# Patient Record
Sex: MALE | Race: WHITE | Employment: FULL TIME | URBAN - METROPOLITAN AREA
[De-identification: names, ages, dates, MRNs, and addresses within clinical notes are randomized per-mention and may not be internally consistent; named-entity substitution may affect disease eponyms.]

---

## 2019-05-22 ENCOUNTER — OFFICE VISIT (OUTPATIENT)
Dept: FAMILY MEDICINE | Facility: CLINIC | Age: 39
End: 2019-05-22

## 2019-05-22 VITALS
OXYGEN SATURATION: 97 % | SYSTOLIC BLOOD PRESSURE: 114 MMHG | HEART RATE: 51 BPM | DIASTOLIC BLOOD PRESSURE: 70 MMHG | HEIGHT: 66 IN | BODY MASS INDEX: 29.35 KG/M2 | TEMPERATURE: 97.6 F | WEIGHT: 182.6 LBS

## 2019-05-22 DIAGNOSIS — Z71.89 ACP (ADVANCE CARE PLANNING): ICD-10-CM

## 2019-05-22 DIAGNOSIS — N50.89 TESTICULAR NODULE: Primary | ICD-10-CM

## 2019-05-22 DIAGNOSIS — Z76.89 HEALTH CARE HOME: ICD-10-CM

## 2019-05-22 PROCEDURE — 99202 OFFICE O/P NEW SF 15 MIN: CPT | Performed by: FAMILY MEDICINE

## 2019-05-22 SDOH — HEALTH STABILITY: MENTAL HEALTH: HOW OFTEN DO YOU HAVE A DRINK CONTAINING ALCOHOL?: 2-3 TIMES A WEEK

## 2019-05-22 SDOH — HEALTH STABILITY: MENTAL HEALTH: HOW MANY STANDARD DRINKS CONTAINING ALCOHOL DO YOU HAVE ON A TYPICAL DAY?: 3 OR 4

## 2019-05-22 ASSESSMENT — MIFFLIN-ST. JEOR: SCORE: 1683.08

## 2019-05-22 NOTE — PROGRESS NOTES
SUBJECTIVE: Álvaro Benjamin is a 38 year old male who presents for left testicular nodule for 6-7 months-was tender initially but not now.  He thinks it has gotten slightly bigger but states it seems to fluctuate.  No dysuria. No weight loss of fatigue.    Patient Active Problem List   Diagnosis     Health Care Home     ACP (advance care planning)     History reviewed. No pertinent past medical history.  No family history on file.  Social History     Socioeconomic History     Marital status:      Spouse name: Not on file     Number of children: Not on file     Years of education: Not on file     Highest education level: Not on file   Occupational History     Occupation: plaster and taping   Social Needs     Financial resource strain: Not on file     Food insecurity:     Worry: Not on file     Inability: Not on file     Transportation needs:     Medical: Not on file     Non-medical: Not on file   Tobacco Use     Smoking status: Never Smoker     Smokeless tobacco: Current User   Substance and Sexual Activity     Alcohol use: Yes     Frequency: 2-3 times a week     Drinks per session: 3 or 4     Drug use: Not on file     Sexual activity: Not on file   Lifestyle     Physical activity:     Days per week: Not on file     Minutes per session: Not on file     Stress: Not on file   Relationships     Social connections:     Talks on phone: Not on file     Gets together: Not on file     Attends Taoism service: Not on file     Active member of club or organization: Not on file     Attends meetings of clubs or organizations: Not on file     Relationship status: Not on file     Intimate partner violence:     Fear of current or ex partner: Not on file     Emotionally abused: Not on file     Physically abused: Not on file     Forced sexual activity: Not on file   Other Topics Concern     Not on file   Social History Narrative     Not on file     Past Surgical History:   Procedure Laterality Date     APPENDECTOMY OPEN   "2013       No current outpatient medications on file prior to visit.  No current facility-administered medications on file prior to visit.      Allergies: Patient has no known allergies.      There is no immunization history on file for this patient.     OBJECTIVE:   /70 (BP Location: Right arm, Patient Position: Sitting, Cuff Size: Adult Large)   Pulse 51   Temp 97.6  F (36.4  C) (Oral)   Ht 1.664 m (5' 5.5\")   Wt 82.8 kg (182 lb 9.6 oz)   SpO2 97%   BMI 29.92 kg/m     Small, firm <1 cm nodule on left side-very difficult to ascertain if this is affixed to testicle or just above in scrotal skin, nontender,  Shaft normal, uncircumcised, meatus normal without discharge. No inguinal hernia noted. No inguinal lymphadenopathy.    ASSESSMENT: /PLAN:   (N50.89) Testicular nodule  (primary encounter diagnosis)  Comment: likely cyst, possibly epididymal but given difficulty determining location and possible increase in size needs US  Plan: US Testicular and Scrotum, RADIOLOGY REFERRAL            (Z76.89) Health Care Home  Comment:   Plan:     (Z71.89) ACP (advance care planning)  Comment:   Plan:    v     "

## 2019-05-22 NOTE — NURSING NOTE
Álvaro is here for lump in testicle    Pre-visit Screening:  Immunizations:  up to date  Colonoscopy:  NA  Mammogram: NA  Asthma Action Test/Plan:  NA  PHQ9:  None  GAD7:  None  Questioned patient about current smoking habits Pt. has never smoked.  Ok to leave detailed message on voice mail for today's visit only Yes, phone # [phone]

## 2019-05-22 NOTE — LETTER
[facility]  [address]  [address]  [facility] [address]  [phone]      May 22, 2019      Álvaro Benjamin  [address]  [place] [address]      EMERGENCY CARE PLAN  Presenting Problem Treatment Plan   Questions or concerns during clinic hours I will call the clinic directly:    [facility]  [address] , [address]  [place] [address]  [phone]   Questions or concerns outside clinic hours  I will call the 24 hour line at [phone]   Patient needs to schedule an appointment  I will call the  scheduling line at [phone]   Same day treatment   I will call the clinic first, then  urgent care and/or  express care if needed   Clinic Care Coordinators Mary Ellen Dickerson RN:  [phone]  [facility] Clinical Support Staff: [phone]    Crisis Services:  Behavioral or Mental Health P ([facility])   [phone]   Emergency treatment--Immediately CALL [phone]

## 2019-05-23 NOTE — NURSING NOTE
Telephone call made to schedule Álvaro Benjamin for the following: Ultrasound: scrotum    Date: 05/23/2019  Time: 5:30  Place: [facility]

## 2019-05-28 ENCOUNTER — TELEPHONE (OUTPATIENT)
Dept: FAMILY MEDICINE | Facility: CLINIC | Age: 39
End: 2019-05-28

## 2019-05-28 DIAGNOSIS — N50.89 TESTICULAR NODULE: ICD-10-CM

## 2019-05-28 NOTE — TELEPHONE ENCOUNTER
Álvaro's wife, Alli, called back wanting a little bit more information about Álvaro's ultrasound findings. She wanted to confirm that it was a lump filled with fluid? Like a cyst? How will it go away, or recommendations to help it go away? Also, what if it begins to become painful what to do? Please advise, thanks.        Call back: [phone]

## 2021-01-07 PROCEDURE — 25600 CLTX DST RDL FX/EPHYS SEP WO: CPT | Mod: LT

## 2021-01-07 PROCEDURE — 99284 EMERGENCY DEPT VISIT MOD MDM: CPT | Mod: 25

## 2021-01-08 ENCOUNTER — APPOINTMENT (OUTPATIENT)
Dept: GENERAL RADIOLOGY | Facility: CLINIC | Age: 41
End: 2021-01-08
Attending: EMERGENCY MEDICINE
Payer: COMMERCIAL

## 2021-01-08 ENCOUNTER — HOSPITAL ENCOUNTER (EMERGENCY)
Facility: CLINIC | Age: 41
Discharge: HOME | End: 2021-01-08
Attending: EMERGENCY MEDICINE | Admitting: EMERGENCY MEDICINE
Payer: COMMERCIAL

## 2021-01-08 VITALS
HEART RATE: 92 BPM | RESPIRATION RATE: 18 BRPM | OXYGEN SATURATION: 98 % | DIASTOLIC BLOOD PRESSURE: 96 MMHG | TEMPERATURE: 98.4 F | SYSTOLIC BLOOD PRESSURE: 149 MMHG

## 2021-01-08 DIAGNOSIS — S62.102A CLOSED FRACTURE OF LEFT WRIST, INITIAL ENCOUNTER: ICD-10-CM

## 2021-01-08 PROCEDURE — 73100 X-RAY EXAM OF WRIST: CPT | Mod: LT

## 2021-01-08 PROCEDURE — 73130 X-RAY EXAM OF HAND: CPT | Mod: LT

## 2021-01-08 PROCEDURE — 250N000013 HC RX MED GY IP 250 OP 250 PS 637: Performed by: EMERGENCY MEDICINE

## 2021-01-08 RX ORDER — ACETAMINOPHEN 325 MG/1
975 TABLET ORAL ONCE
Status: COMPLETED | OUTPATIENT
Start: 2021-01-08 | End: 2021-01-08

## 2021-01-08 RX ORDER — OXYCODONE AND ACETAMINOPHEN 5; 325 MG/1; MG/1
1-2 TABLET ORAL EVERY 4 HOURS PRN
Qty: 12 TABLET | Refills: 0 | Status: SHIPPED | OUTPATIENT
Start: 2021-01-08 | End: 2021-02-08

## 2021-01-08 RX ADMIN — ACETAMINOPHEN 975 MG: 325 TABLET, FILM COATED ORAL at 00:54

## 2021-01-08 ASSESSMENT — ENCOUNTER SYMPTOMS
HEADACHES: 0
ARTHRALGIAS: 1
MYALGIAS: 1

## 2021-01-08 NOTE — ED PROVIDER NOTES
History   Chief Complaint:  Fall       HPI   Álvaro Benjamin is a 40 year old male who presents with fall. The patient reports that at 1630 this afternoon he fell off his truck and braced himself onto his left hand. The patient notes that he injured his left wrist and hand during the fall and that throughout the evening this developed worsened pain and swelling, prompting him to the ED. He states that he is still able to move his left wrist, hand, and fingers. The patient denies headache and other issues.      Review of Systems   Musculoskeletal: Positive for arthralgias and myalgias.        Left hand and wrist pain   Neurological: Negative for headaches.   All other systems reviewed and are negative.        Allergies:  The patient has no known allergies.     Medications:  The patient is not currently taking any prescribed medications.     Past Medical History:     The patient denies past medical history.      Past Surgical History:    Appendectomy     Family History:    The patient denies past family history.     Social History:  Patient presents to the ED alone.     Physical Exam     Patient Vitals for the past 24 hrs:   BP Temp Temp src Pulse Resp SpO2   01/08/21 0002 (!) 149/96 98.4  F (36.9  C) Temporal 92 18 98 %       Physical Exam  Constitutional:  Oriented to person, place, and time.   HENT:   Head:    Normocephalic. Atraumatic  Mouth/Throat:   Oropharynx is clear and moist.   Eyes:    EOM are normal. Pupils are equal, round, and reactive to light.   Neck:    Neck supple.   Musculoskeletal:  Normal range of motion. No midline spinal tenderness. General tenderness to left wrist and hand. No deformity, slight swelling left wrist. Mild tenderness to radial wrist and proximal hand.   Neurological:   Alert and oriented to person, place, and time. GCS: 15. Left upper extremity is neurovascularly intact.           Moves all 4 extremities spontaneously    Skin:    No rash noted. No pallor.  Mild abrasions to  left shoulder and neck are noted.     Emergency Department Course     Imaging:  XR Wrist Left 2 Views   Final Result   IMPRESSION:    1. A very subtle longitudinally-oriented lucency in the cortex of the distal epiphysis of the left radius, only visualized on the anteroposterior projection image, suspicious for a nondisplaced acute fracture. Recommend correlation with site of the    patient's pain.   2. No other findings suspicious for acute fracture or malalignment of the left wrist.      XR Hand Left G/E 3 Views   Final Result   IMPRESSION: Normal joint spaces and alignment. No fracture. Diffuse soft tissue swelling involving the hand. No soft tissue gas evident.        Procedures:     [place] with Orth Glass Splint Placement     Splint was applied and after placement I checked and adjusted the fit to ensure proper positioning. Patient was more comfortable with splint in place. Sensation and circulation are intact after splint placement.    Emergency Department Course:    Reviewed:  I reviewed nursing notes, vitals, past medical history and care everywhere    Assessments:  0039: I initially evaluated the patient in ED room 7.    0210: I reassessed the patient and placed splint.     Interventions:   0054, Tylenol, 975 mg, PO    Disposition:  The patient was discharged to home.       Impression & Plan   Medical Decision Making:  Álvaro Benjamin is a 40 year old male who presents for evaluation of wrist pain after fall. CMS is intact distally in the extremity.  Xrays reveal a fracture that does not need reduction at this time.  The patient/family understand that this may change with time and orthopedic follow-up is indicated.  There is no indication for ortho consultation from the ED. Rather, close follow-up is indicated in the next days.  Splint and fracture precautions for home.  The patients head to toe trauma exam is otherwise normal at this time and no further trauma workup is needed as I believe there is no  signs of serious head, neck, chest, spinal, extremity or abdominal injuries. This is a non-operative injury and unless further angulation with time or unrelenting pain, definitive fracture care is provided.  Patient was educated on natural course of this fracture, provided pain medication, educated on natural course of this fracture, need for increasing gentle activity (walking around block, increase distance each day), complications of fractures (pneumonia, DVT, etc).  They need to follow up with primary only and only if further symptoms or progressive symptoms will need to seek care again in ED or with specialist.       Diagnosis:    ICD-10-CM    1. Closed fracture of left wrist, initial encounter  S62.102A        Discharge Medications:  New Prescriptions    OXYCODONE-ACETAMINOPHEN (PERCOCET) 5-325 MG TABLET    Take 1-2 tablets by mouth every 4 hours as needed for pain       Scribe Disclosure:  ROSARIO, Aroldo Bell, am serving as a scribe at 12:38 AM on 1/8/2021 to document services personally performed by Sandeep Beckford MD based on my observations and the provider's statements to me.      [facility]  January 8, 2021      Sandeep Beckford MD  01/09/21 0304

## 2021-01-08 NOTE — ED TRIAGE NOTES
Here for slip and fall while getting out of his truck. Fell on onto asphalt and ice ground, sustain injury and pain to left hand, scratches to left upper arm, abrasion to left lateral neck. Swelling noted left wrist/hand with numbness to fingers. Took ibuprofen 1 hour prior to arrival. ABCs intact.

## 2021-01-08 NOTE — ED AVS SNAPSHOT
[facility] Emergency Dept  [address]  [facility] [address]  Phone: [phone]  Fax: [phone]                                    Álvaro Benjamin   MRN: [id number]    Department: [facility] Emergency Dept   Date of Visit: 1/7/2021           After Visit Summary Signature Page    I have received my discharge instructions, and my questions have been answered. I have discussed any challenges I see with this plan with the nurse or doctor.    ..........................................................................................................................................  Patient/Patient Representative Signature      ..........................................................................................................................................  Patient Representative Print Name and Relationship to Patient    ..................................................               ................................................  Date                                   Time    ..........................................................................................................................................  Reviewed by Signature/Title    ...................................................              ..............................................  Date                                               Time          22EPIC Rev 08/18

## 2021-02-08 ENCOUNTER — OFFICE VISIT (OUTPATIENT)
Dept: FAMILY MEDICINE | Facility: CLINIC | Age: 41
End: 2021-02-08

## 2021-02-08 VITALS
BODY MASS INDEX: 31.15 KG/M2 | RESPIRATION RATE: 20 BRPM | DIASTOLIC BLOOD PRESSURE: 92 MMHG | SYSTOLIC BLOOD PRESSURE: 134 MMHG | TEMPERATURE: 97.9 F | HEART RATE: 72 BPM | HEIGHT: 66 IN | WEIGHT: 193.8 LBS

## 2021-02-08 DIAGNOSIS — Z13.220 SCREENING FOR LIPOID DISORDERS: ICD-10-CM

## 2021-02-08 DIAGNOSIS — Z00.00 ROUTINE GENERAL MEDICAL EXAMINATION AT A HEALTH CARE FACILITY: Primary | ICD-10-CM

## 2021-02-08 DIAGNOSIS — L70.0 ACNE VULGARIS: ICD-10-CM

## 2021-02-08 LAB
ALBUMIN SERPL-MCNC: 4.8 G/DL (ref 3.6–5.1)
ALBUMIN/GLOB SERPL: 1.7 {RATIO} (ref 1–2.5)
ALP SERPL-CCNC: 72 U/L (ref 33–130)
ALT 1742-6: 84 U/L (ref 0–32)
AST 1920-8: 45 U/L (ref 0–35)
BILIRUB SERPL-MCNC: 0.9 MG/DL (ref 0.2–1.2)
BUN SERPL-MCNC: 15 MG/DL (ref 7–25)
BUN/CREATININE RATIO: 17.6 (ref 6–22)
CALCIUM SERPL-MCNC: 10.1 MG/DL (ref 8.6–10.3)
CHLORIDE SERPLBLD-SCNC: 101.2 MMOL/L (ref 98–110)
CHOLEST SERPL-MCNC: 302 MG/DL (ref 0–199)
CHOLEST/HDLC SERPL: 5 {RATIO} (ref 0–5)
CO2 SERPL-SCNC: 29 MMOL/L (ref 20–32)
CREAT SERPL-MCNC: 0.85 MG/DL (ref 0.6–1.3)
GLOBULIN, CALCULATED - QUEST: 2.9 (ref 1.9–3.7)
GLUCOSE SERPL-MCNC: 99 MG/DL (ref 60–99)
HDLC SERPL-MCNC: 67 MG/DL (ref 40–150)
LDLC SERPL CALC-MCNC: 151 MG/DL (ref 0–130)
POTASSIUM SERPL-SCNC: 4.14 MMOL/L (ref 3.5–5.3)
PROT SERPL-MCNC: 7.7 G/DL (ref 6.1–8.1)
SODIUM SERPL-SCNC: 140.6 MMOL/L (ref 135–146)
TRIGL SERPL-MCNC: 420 MG/DL (ref 0–149)

## 2021-02-08 PROCEDURE — 80061 LIPID PANEL: CPT | Performed by: FAMILY MEDICINE

## 2021-02-08 PROCEDURE — 36415 COLL VENOUS BLD VENIPUNCTURE: CPT | Performed by: FAMILY MEDICINE

## 2021-02-08 PROCEDURE — 99396 PREV VISIT EST AGE 40-64: CPT | Performed by: FAMILY MEDICINE

## 2021-02-08 PROCEDURE — 80053 COMPREHEN METABOLIC PANEL: CPT | Performed by: FAMILY MEDICINE

## 2021-02-08 RX ORDER — CLINDAMYCIN PHOSPHATE 10 MG/G
GEL TOPICAL 2 TIMES DAILY
Qty: 30 G | Refills: 0 | Status: SHIPPED | OUTPATIENT
Start: 2021-02-08 | End: 2024-08-19

## 2021-02-08 SDOH — ECONOMIC STABILITY: TRANSPORTATION INSECURITY
IN THE PAST 12 MONTHS, HAS THE LACK OF TRANSPORTATION KEPT YOU FROM MEDICAL APPOINTMENTS OR FROM GETTING MEDICATIONS?: NOT ASKED

## 2021-02-08 SDOH — HEALTH STABILITY: MENTAL HEALTH: HOW OFTEN DO YOU HAVE 6 OR MORE DRINKS ON ONE OCCASION?: NEVER

## 2021-02-08 SDOH — HEALTH STABILITY: MENTAL HEALTH: HOW OFTEN DO YOU HAVE A DRINK CONTAINING ALCOHOL?: 2-4 TIMES A MONTH

## 2021-02-08 SDOH — ECONOMIC STABILITY: FOOD INSECURITY: WITHIN THE PAST 12 MONTHS, YOU WORRIED THAT YOUR FOOD WOULD RUN OUT BEFORE YOU GOT MONEY TO BUY MORE.: NOT ASKED

## 2021-02-08 SDOH — ECONOMIC STABILITY: FOOD INSECURITY: WITHIN THE PAST 12 MONTHS, THE FOOD YOU BOUGHT JUST DIDN'T LAST AND YOU DIDN'T HAVE MONEY TO GET MORE.: NOT ASKED

## 2021-02-08 SDOH — ECONOMIC STABILITY: INCOME INSECURITY: HOW HARD IS IT FOR YOU TO PAY FOR THE VERY BASICS LIKE FOOD, HOUSING, MEDICAL CARE, AND HEATING?: NOT ASKED

## 2021-02-08 SDOH — ECONOMIC STABILITY: TRANSPORTATION INSECURITY
IN THE PAST 12 MONTHS, HAS LACK OF TRANSPORTATION KEPT YOU FROM MEETINGS, WORK, OR FROM GETTING THINGS NEEDED FOR DAILY LIVING?: NOT ASKED

## 2021-02-08 ASSESSMENT — MIFFLIN-ST. JEOR: SCORE: 1731.82

## 2021-02-08 NOTE — LETTER
February 9, 2021      [facility] Sourav Diaz  [address] [place] [address]        Dear Mr.Ontiveros Diaz,    We are writing to inform you of your test results.        The results of your recent Blood Sugar and Kidney Tests were within normal limits.     Your liver tests did show some inflammation.  I would watch alcohol intake and work on healty diet.  This can help.  We will want to recheck in 6 months or so.     Your recent cholesterol numbers are elevated.  The new guidelines recommend we perform a risk calculation to determine if medications might be warranted. This calculation estimates your risk of a cardiovascular event in the next 10 years.  If the result is over 7.5% risk then statin medication is recommended. Your risk comes in at 1.8% so no problems. Just work on healthy diet and exercise.     The 10-year ASCVD risk score (Kori COLORADO Jr., et al., 2013) is: 1.8%    Values used to calculate the score:      Age: 40 years      Sex: Male      Is Non- : No      Diabetic: No      Tobacco smoker: No      Systolic Blood Pressure: 134 mmHg      Is BP treated: No      HDL Cholesterol: 67 mg/dL      Total Cholesterol: 302 mg/dL     The results are now released on My Chart. Please contact me if you have further questions or concerns.    Take care,    BALDEV Escoto M.D.     Resulted Orders   Lipid Panel (BFP)   Result Value Ref Range    Cholesterol 302 (A) 0 - 199 mg/dL    Triglycerides 420 (A) 0 - 149 mg/dL    HDL Cholesterol 67 40 - 150 mg/dL    LDL Cholesterol Direct 151 (A) 0 - 130 mg/dL    Cholesterol/HDL Ratio 5 0 - 5   Comprehensive Metobolic Panel (BFP)   Result Value Ref Range    Carbon Dioxide 29.0 20 - 32 mmol/L    Creatinine 0.85 0.60 - 1.30 mg/dL    Glucose 99 60 - 99 mg/dL    Sodium 140.6 135 - 146 mmol/L    Potassium 4.14 3.5 - 5.3 mmol/L    Chloride 101.2 98 - 110 mmol/L    Protein Total 7.7 6.1 - 8.1 g/dL    Albumin 4.8 3.6 - 5.1 g/dL    Alkaline Phosphatase 72 33 -  130 U/L    ALT 84 (A) 0 - 32 U/L    AST 45 (A) 0 - 35 U/L    Bilirubin Total 0.9 0.2 - 1.2 mg/dL    Urea Nitrogen 15 7 - 25 mg/dL    Calcium 10.1 8.6 - 10.3 mg/dL    BUN/Creatinine Ratio 17.6 6 - 22    Globulin Calculated 2.9 1.9 - 3.7    A/G Ratio 1.7 1 - 2.5       If you have any questions or concerns, please call the clinic at the number listed above.       Sincerely,      Steve Escoto MD

## 2021-02-08 NOTE — PROGRESS NOTES
3  SUBJECTIVE:   CC: Álvaro Diaz is an 40 year old male who presents for preventive health visit.       Patient has been advised of split billing requirements and indicates understanding: Yes  Healthy Habits:    Do you get at least three servings of calcium containing foods daily (dairy, green leafy vegetables, etc.)? yes    Amount of exercise or daily activities, outside of work: minimal since broke wrist-ED notes reviewed    Problems taking medications regularly No    Medication side effects: No    Have you had an eye exam in the past two years? no    Do you see a dentist twice per year? yes    Do you have sleep apnea, excessive snoring or daytime drowsiness?no          Today's PHQ-2 Score:   PHQ-2 ( 1999 Pf[employer]) 5/22/2019   Q1: Little interest or pleasure in doing things 0   Q2: Feeling down, depressed or hopeless 0   PHQ-2 Score 0       Abuse: Current or Past(Physical, Sexual or Emotional)- No  Do you feel safe in your environment? Yes        Social History     Tobacco Use     Smoking status: Never Smoker     Smokeless tobacco: Current User   Substance Use Topics     Alcohol use: Yes     Frequency: 2-4 times a month     Drinks per session: 3 or 4     Binge frequency: Never     If you drink alcohol do you typically have >3 drinks per day or >7 drinks per week? No                      Last PSA: No results found for: PSA    Reviewed orders with patient. Reviewed health maintenance and updated orders accordingly - Yes  BP Readings from Last 3 Encounters:   02/08/21 (!) 134/92   01/08/21 (!) 149/96   05/22/19 114/70    Wt Readings from Last 3 Encounters:   02/08/21 87.9 kg (193 lb 12.8 oz)   05/22/19 82.8 kg (182 lb 9.6 oz)                  Patient Active Problem List   Diagnosis     Health Care Home     ACP (advance care planning)     Past Surgical History:   Procedure Laterality Date     APPENDECTOMY OPEN  2013       Social History     Tobacco Use     Smoking status: Never Smoker     Smokeless tobacco:  "Current User   Substance Use Topics     Alcohol use: Yes     Frequency: 2-4 times a month     Drinks per session: 3 or 4     Binge frequency: Never     No family history on file.      No current outpatient medications on file.     No Known Allergies  No lab results found.     Reviewed and updated as needed this visit by clinical staff  Tobacco                Reviewed and updated as needed this visit by Provider                No past medical history on file.   Past Surgical History:   Procedure Laterality Date     APPENDECTOMY OPEN  2013       ROS:  CONSTITUTIONAL: NEGATIVE for fever, chills, change in weight  INTEGUMENTARY/SKIN: NEGATIVE for worrisome rashes, moles or lesions  EYES: NEGATIVE for vision changes or irritation  ENT: NEGATIVE for ear, mouth and throat problems  RESP: NEGATIVE for significant cough or SOB  CV: NEGATIVE for chest pain, palpitations or peripheral edema  GI: NEGATIVE for nausea, abdominal pain, heartburn, or change in bowel habits   male: negative for dysuria, hematuria, decreased urinary stream, erectile dysfunction, urethral discharge  MUSCULOSKELETAL: NEGATIVE for significant arthralgias or myalgia  NEURO: NEGATIVE for weakness, dizziness or paresthesias  ENDOCRINE: NEGATIVE for temperature intolerance, skin/hair changes  PSYCHIATRIC: NEGATIVE for changes in mood or affect    OBJECTIVE:   BP (!) 134/92 (BP Location: Right arm, Patient Position: Chair, Cuff Size: Adult Regular)   Pulse 72   Temp 97.9  F (36.6  C)   Ht 1.676 m (5' 6\")   Wt 87.9 kg (193 lb 12.8 oz)   BMI 31.28 kg/m    EXAM:  GENERAL: healthy, alert and no distress  EYES: Eyes grossly normal to inspection, PERRL and conjunctivae and sclerae normal  HENT: ear canals and TM's normal, nose and mouth without ulcers or lesions  NECK: no adenopathy, no asymmetry, masses, or scars and thyroid normal to palpation  RESP: lungs clear to auscultation - no rales, rhonchi or wheezes  CV: regular rate and rhythm, normal S1 S2, no " "S3 or S4, no murmur, click or rub, no peripheral edema and peripheral pulses strong  ABDOMEN: soft, nontender, no hepatosplenomegaly, no masses and bowel sounds normal   (male): normal male genitalia without lesions or urethral discharge, no hernia  RECTAL (male): deferred  MS: no gross musculoskeletal defects noted, no edema  SKIN: moderate acne on face -started with ask wearing  NEURO: Normal strength and tone, mentation intact and speech normal  PSYCH: mentation appears normal, affect normal/bright    Diagnostic Test Results:  Labs reviewed in Epic    ASSESSMENT/PLAN:   (Z00.00) Routine general medical examination at a health care facility  (primary encounter diagnosis)  Comment: discussed preventitive healthcare   Plan: Continue to work on healthy diet and exercise, discussed healthy habits     (Z13.220) Screening for lipoid disorders  Comment:   Plan: Lipid Panel (BFP), Comprehensive Metobolic         Panel (BFP), VENOUS COLLECTION      (L70.0) Acne vulgaris  Comment: mask related acne-  Plan: clindamycin (CLINDAMAX) 1 % external gel        recommendfacial cleaning BID, cleocin BID, f/u if not improving or worsening          Patient has been advised of split billing requirements and indicates understanding: Yes  COUNSELING:  Reviewed preventive health counseling, as reflected in patient instructions       Regular exercise       Healthy diet/nutrition    Estimated body mass index is 31.28 kg/m  as calculated from the following:    Height as of this encounter: 1.676 m (5' 6\").    Weight as of this encounter: 87.9 kg (193 lb 12.8 oz).    Weight management plan: Discussed healthy diet and exercise guidelines    He reports that he has never smoked. He uses smokeless tobacco.      Counseling Resources:  ATP IV Guidelines  Pooled Cohorts Equation Calculator  FRAX Risk Assessment  ICSI Preventive Guidelines  Dietary Guidelines for Americans, 2010  USDA's MyPlate  ASA Prophylaxis  Lung CA Screening    Steve " Yohannes Escoto MD  [facility]

## 2021-02-08 NOTE — NURSING NOTE
Álvaro Benjamin Diaz is here for a CPX.    Pre-visit planning  Immunizations -unknown  Colonoscopy -  Mammogram -  Asthma test --  PHQ9 -  MICK 7 -    Questioned patient about current smoking habits.  Pt. has never smoked.  Body mass index is 31.28 kg/m .  PULSE regular  My Chart:   CLASSIFICATION OF OVERWEIGHT AND OBESITY BY BMI                        Obesity Class           BMI(kg/m2)  Underweight                                    < 18.5  Normal                                         18.5-24.9  Overweight                                     25.0-29.9  OBESITY                     I                  30.0-34.9                             II                 35.0-39.9  EXTREME OBESITY             III                >40                            Patient's  BMI Body mass index is 31.28 kg/m .

## 2024-06-17 PROBLEM — Z76.89 HEALTH CARE HOME: Status: RESOLVED | Noted: 2019-05-22 | Resolved: 2024-06-17

## 2024-08-19 ENCOUNTER — OFFICE VISIT (OUTPATIENT)
Dept: FAMILY MEDICINE | Facility: CLINIC | Age: 44
End: 2024-08-19

## 2024-08-19 VITALS
SYSTOLIC BLOOD PRESSURE: 118 MMHG | TEMPERATURE: 97.8 F | DIASTOLIC BLOOD PRESSURE: 78 MMHG | BODY MASS INDEX: 29.67 KG/M2 | WEIGHT: 184.6 LBS | HEIGHT: 66 IN | HEART RATE: 60 BPM | RESPIRATION RATE: 20 BRPM

## 2024-08-19 DIAGNOSIS — Z23 NEED FOR VACCINATION: ICD-10-CM

## 2024-08-19 DIAGNOSIS — Z00.00 ROUTINE GENERAL MEDICAL EXAMINATION AT A HEALTH CARE FACILITY: Primary | ICD-10-CM

## 2024-08-19 DIAGNOSIS — E78.2 MIXED HYPERLIPIDEMIA: ICD-10-CM

## 2024-08-19 DIAGNOSIS — R79.89 ELEVATED LFTS: ICD-10-CM

## 2024-08-19 DIAGNOSIS — Z11.59 SCREENING FOR VIRAL DISEASE: ICD-10-CM

## 2024-08-19 LAB
ALBUMIN SERPL-MCNC: 4.1 G/DL (ref 3.6–5.1)
ALP SERPL-CCNC: 54 U/L (ref 33–130)
ALT 1742-6: 18 U/L (ref 0–32)
AST 1920-8: 15 U/L (ref 0–35)
BILIRUB SERPL-MCNC: 0.4 MG/DL (ref 0.2–1.2)
BUN SERPL-MCNC: 16 MG/DL (ref 7–25)
BUN/CREATININE RATIO: 15 (ref 6–32)
CALCIUM SERPL-MCNC: 9.3 MG/DL (ref 8.6–10.3)
CHLORIDE SERPLBLD-SCNC: 102.6 MMOL/L (ref 98–110)
CHOLEST SERPL-MCNC: 219 MG/DL (ref 0–199)
CHOLEST/HDLC SERPL: 4 {RATIO} (ref 0–5)
CO2 SERPL-SCNC: 26.8 MMOL/L (ref 20–32)
CREAT SERPL-MCNC: 1.05 MG/DL (ref 0.6–1.3)
GLUCOSE SERPL-MCNC: 105 MG/DL (ref 60–99)
HDLC SERPL-MCNC: 61 MG/DL (ref 40–150)
LDLC SERPL CALC-MCNC: 96 MG/DL
POTASSIUM SERPL-SCNC: 4.3 MMOL/L (ref 3.5–5.3)
PROT SERPL-MCNC: 6.4 G/DL (ref 6.1–8.1)
SODIUM SERPL-SCNC: 137.9 MMOL/L (ref 135–146)
TRIGL SERPL-MCNC: 310 MG/DL (ref 0–149)

## 2024-08-19 PROCEDURE — 90715 TDAP VACCINE 7 YRS/> IM: CPT | Performed by: FAMILY MEDICINE

## 2024-08-19 PROCEDURE — 90471 IMMUNIZATION ADMIN: CPT | Performed by: FAMILY MEDICINE

## 2024-08-19 PROCEDURE — 87389 HIV-1 AG W/HIV-1&-2 AB AG IA: CPT | Mod: 90 | Performed by: FAMILY MEDICINE

## 2024-08-19 PROCEDURE — 86803 HEPATITIS C AB TEST: CPT | Mod: 90 | Performed by: FAMILY MEDICINE

## 2024-08-19 PROCEDURE — 99386 PREV VISIT NEW AGE 40-64: CPT | Mod: 25 | Performed by: FAMILY MEDICINE

## 2024-08-19 PROCEDURE — 36415 COLL VENOUS BLD VENIPUNCTURE: CPT | Performed by: FAMILY MEDICINE

## 2024-08-19 PROCEDURE — 80053 COMPREHEN METABOLIC PANEL: CPT | Performed by: FAMILY MEDICINE

## 2024-08-19 PROCEDURE — 80061 LIPID PANEL: CPT | Performed by: FAMILY MEDICINE

## 2024-08-19 NOTE — NURSING NOTE
Álvaro Diaz is here for a new Pt CPX.    Pre-visit planning  Immunizations -tdap  Colonoscopy -na  Mammogram -  Asthma test --  PHQ9 -  MICK 7 -      Questioned patient about current smoking habits.  Pt. has never smoked.  Body mass index is 29.8 kg/m .  PULSE regular  My Chart: declines  CLASSIFICATION OF OVERWEIGHT AND OBESITY BY BMI                        Obesity Class           BMI(kg/m2)  Underweight                                    < 18.5  Normal                                         18.5-24.9  Overweight                                     25.0-29.9  OBESITY                     I                  30.0-34.9                             II                 35.0-39.9  EXTREME OBESITY             III                >40                            Patient's  BMI Body mass index is 29.8 kg/m .      The patient has verbalized that it is ok to leave a detailed voice message on the patient's cell phone with results/recommendations from this visit.

## 2024-08-19 NOTE — PROGRESS NOTES
"3  SUBJECTIVE:   CC: Álvaro Diaz is an 43 year old male who presents for preventive health visit.       Patient has been advised of split billing requirements and indicates understanding: Yes  Healthy Habits:  Do you get at least three servings of calcium containing foods daily (dairy, green leafy vegetables, etc.)? yes  Amount of exercise or daily activities, outside of work: minimal but a lot at work day(s) per week  Problems taking medications regularly No  Medication side effects: No  Have you had an eye exam in the past two years? no  Do you see a dentist twice per year? no  Do you have sleep apnea, excessive snoring or daytime drowsiness?no          Today's PHQ-2 Score:       8/19/2024     7:51 AM 5/22/2019     5:56 PM   PHQ-2 ( 1999 [employer])   Q1: Little interest or pleasure in doing things 0 0   Q2: Feeling down, depressed or hopeless 0 0   PHQ-2 Score 0 0   PHQ-2 Total Score (12-17 Years)- Positive if 3 or more points; Administer PHQ-A if positive  0       Abuse: Current or Past(Physical, Sexual or Emotional)- No  Do you feel safe in your environment? Yes        Social History     Tobacco Use    Smoking status: Never     Passive exposure: Never    Smokeless tobacco: Current     Types: Chew   Substance Use Topics    Alcohol use: Yes     Alcohol/week: 6.0 standard drinks of alcohol     Types: 6 Standard drinks or equivalent per week     If you drink alcohol do you typically have >3 drinks per day or >7 drinks per week? No                      Last PSA: No results found for: \"PSA\"    Reviewed orders with patient. Reviewed health maintenance and updated orders accordingly - Yes  BP Readings from Last 3 Encounters:   08/19/24 118/78   02/08/21 (!) 134/92   01/08/21 (!) 149/96    Wt Readings from Last 3 Encounters:   08/19/24 83.7 kg (184 lb 9.6 oz)   02/08/21 87.9 kg (193 lb 12.8 oz)   05/22/19 82.8 kg (182 lb 9.6 oz)                  Patient Active Problem List   Diagnosis    ACP (advance care " "planning)     Past Surgical History:   Procedure Laterality Date    APPENDECTOMY OPEN  2013       Social History     Tobacco Use    Smoking status: Never     Passive exposure: Never    Smokeless tobacco: Current     Types: Chew   Substance Use Topics    Alcohol use: Yes     Alcohol/week: 6.0 standard drinks of alcohol     Types: 6 Standard drinks or equivalent per week     History reviewed. No pertinent family history.      No current outpatient medications on file.     No Known Allergies  Recent Labs   Lab Test 02/08/21  1319   *   HDL 67   TRIG 420*   CR 0.85   POTASSIUM 4.14        Reviewed and updated as needed this visit by clinical staff   Tobacco   Meds  Problems  Med Hx  Surg Hx  Fam Hx          Reviewed and updated as needed this visit by Provider   Tobacco      Surg Hx  Fam Hx          History reviewed. No pertinent past medical history.   Past Surgical History:   Procedure Laterality Date    APPENDECTOMY OPEN  2013       ROS:  CONSTITUTIONAL: NEGATIVE for fever, chills, change in weight  INTEGUMENTARY/SKIN: NEGATIVE for worrisome rashes, moles or lesions  EYES: NEGATIVE for vision changes or irritation  ENT: NEGATIVE for ear, mouth and throat problems  RESP: NEGATIVE for significant cough or SOB  CV: NEGATIVE for chest pain, palpitations or peripheral edema  GI: NEGATIVE for nausea, abdominal pain, heartburn, or change in bowel habits   male: negative for dysuria, hematuria, decreased urinary stream, erectile dysfunction, urethral discharge  MUSCULOSKELETAL: NEGATIVE for significant arthralgias or myalgia  NEURO: NEGATIVE for weakness, dizziness or paresthesias  PSYCHIATRIC: NEGATIVE for changes in mood or affect    OBJECTIVE:   /78 (BP Location: Right arm, Patient Position: Chair, Cuff Size: Adult Regular)   Pulse 60   Temp 97.8  F (36.6  C) (Temporal)   Resp 20   Ht 1.676 m (5' 6\")   Wt 83.7 kg (184 lb 9.6 oz)   BMI 29.80 kg/m    EXAM:  GENERAL: alert and no distress  EYES: " Eyes grossly normal to inspection, PERRL and conjunctivae and sclerae normal  HENT: ear canals and TM's normal, nose and mouth without ulcers or lesions  NECK: no adenopathy, no asymmetry, masses, or scars  RESP: lungs clear to auscultation - no rales, rhonchi or wheezes  CV: regular rate and rhythm, normal S1 S2, no S3 or S4, no murmur, click or rub, no peripheral edema  ABDOMEN: soft, nontender, no hepatosplenomegaly, no masses and bowel sounds normal   (male): normal male genitalia without lesions or urethral discharge, no hernia  MS: no gross musculoskeletal defects noted, no edema  SKIN: no suspicious lesions or rashes  NEURO: Normal strength and tone, mentation intact and speech normal  PSYCH: mentation appears normal, affect normal/bright    Diagnostic Test Results:  Labs reviewed in Epic    ASSESSMENT/PLAN:   (Z00.00) Routine general medical examination at a health care facility  (primary encounter diagnosis)  Comment: discussed preventitive healthcare   Plan: Continue to work on healthy diet and exercise, discussed healthy habits     (E78.2) Mixed hyperlipidemia  Comment: control uncertain  Plan: Lipid Panel ([facility]), Comprehensive Metobolic         Panel ([facility]), VENOUS COLLECTION        Continue to work on healthy diet and exercise, discussed healthy habits pending ;abs    (R79.89) Elevated LFTs  Comment: recheck, suspect fatty liver  Plan: Comprehensive Metobolic Panel ([facility]), VENOUS         COLLECTION        Continue to work on healthy diet and exercise, discussed healthy habits     (Z11.59) Screening for viral disease  Comment: per cdc  Plan: VENOUS COLLECTION, HIV 1/2 Agn Milagro 4th Gen w         Reflex ([facility]), HEPATITIS C ANTIBODY ([facility])            (Z23) Need for vaccination  Comment:   Plan: TDAP VACCINE (Adacel, Boostrix)  [[id number]]            Patient has been advised of split billing requirements and indicates understanding: Yes  COUNSELING:  Reviewed preventive health counseling, as reflected in  "patient instructions       Regular exercise       Healthy diet/nutrition       Vision screening       Immunizations  Vaccinated for: TDAP           Consider Hep C screening for all patients one time for ages 18-79 years       HIV screeninx in teen years, 1x in adult years, and at intervals if high risk    Estimated body mass index is 29.8 kg/m  as calculated from the following:    Height as of this encounter: 1.676 m (5' 6\").    Weight as of this encounter: 83.7 kg (184 lb 9.6 oz).    Weight management plan: Discussed healthy diet and exercise guidelines    He reports that he has never smoked. He has never been exposed to tobacco smoke. His smokeless tobacco use includes chew.      Counseling Resources:  ATP IV Guidelines  Pooled Cohorts Equation Calculator  FRAX Risk Assessment  ICSI Preventive Guidelines  Dietary Guidelines for Americans, 2010  USDA's MyPlate  ASA Prophylaxis  Lung CA Screening    Steve Escoto MD  [facility]  "

## 2024-08-19 NOTE — LETTER
August 26, 2024      [facility] Sourav Diaz  [address] [place] [address]        Dear Mr.Ontiveros Diaz,    We are writing to inform you of your test results.        The results of your recent HIV test, Hep C test, liver and kidney tests were within normal limits    Your recent cholesterol numbers are elevated.  The new guidelines recommend we perform a risk calculation to determine if medications might be warranted. This calculation estimates your risk of a cardiovascular event in the next 10 years.  If the result is over 7.5% risk then statin medication is recommended. Your risk comes in at 1% so not need for meds- just work on healthy diet and exercise -this should also help the mildly elevated blood glucose    The 10-year ASCVD risk score (Lucie HOUSE, et al., 2019) is: 1.3%    Values used to calculate the score:      Age: 43 years      Sex: Male      Is Non- : No      Diabetic: No      Tobacco smoker: No      Systolic Blood Pressure: 118 mmHg      Is BP treated: No      HDL Cholesterol: 61 mg/dL      Total Cholesterol: 219 mg/dL     . The results are now released on My Chart. Please contact me if you have further questions or concerns.      Resulted Orders   Lipid Panel ([facility])   Result Value Ref Range    Cholesterol 219 (A) 0 - 199 mg/dL    Triglycerides 310 (A) 0 - 149 mg/dL    HDL Cholesterol 61 40 - 150 mg/dL    LDL-C 96 mg/dL    Cholesterol/HDL Ratio 4 0 - 5   Comprehensive Metobolic Panel ([facility])   Result Value Ref Range    Carbon Dioxide 26.8 20 - 32 mmol/L    Creatinine 1.05 0.60 - 1.30 mg/dL    Glucose 105 (A) 60 - 99 mg/dL    Sodium 137.9 135 - 146 mmol/L    Potassium 4.3 3.5 - 5.3 mmol/L    Chloride 102.6 98 - 110 mmol/L    Protein Total 6.4 6.1 - 8.1 g/dL    Albumin 4.1 3.6 - 5.1 g/dL    Alkaline Phosphatase 54 33 - 130 U/L    ALT 18 0 - 32 U/L    AST 15 0 - 35 U/L    Bilirubin Total 0.4 0.2 - 1.2 mg/dL    Urea Nitrogen 16 7 - 25 mg/dL    Calcium 9.3 8.6 - 10.3  mg/dL    BUN/Creatinine Ratio 15 6 - 32   HIV 1/2 Agn Milagro 4th Gen w Reflex ([facility])   Result Value Ref Range    HIV 1/2 Agn Milagro 4th Gen With Reflex NON-REACTIVE NON-REACTIVE      Comment:      HIV-1 antigen and HIV-1/HIV-2 antibodies were not  detected. There is no laboratory evidence of HIV  infection.     PLEASE NOTE: This information has been disclosed to  you from records whose confidentiality may be  protected by state law.  If your state requires such  protection, then the state law prohibits you from  making any further disclosure of the information  without the specific written consent of the person  to whom it pertains, or as otherwise permitted by law.  A general authorization for the release of medical or  other information is NOT sufficient for this purpose.      For additional information please refer to  [email or web address].Dianxin/faq/ZRV836  (This link is being provided for informational/  educational purposes only.)        The performance of this assay has not been clinically  validated in patients less than 2 years old.         Narrative    FASTING: UNKNOWN   HEPATITIS C ANTIBODY ([facility])   Result Value Ref Range    HCV Antibody NON-REACTIVE NON-REACTIVE      Comment:         HCV antibody was non-reactive. There is no laboratory   evidence of HCV infection.     In most cases, no further action is required. However,  if recent HCV exposure is suspected, a test for HCV RNA  (test code 78574) is suggested.     For additional information please refer to  [email or web address]/faq/[email or web address]  (This link is being provided for informational/  educational purposes only.)         Narrative    FASTING: UNKNOWN       If you have any questions or concerns, please call the clinic at the number listed above.       Sincerely,      Steve Escoto MD

## 2024-08-20 LAB
HCV AB - QUEST: NORMAL
HIV 1/2 AGN ABY 4TH GEN WITH REFLEX: NORMAL